# Patient Record
Sex: FEMALE | Race: WHITE | NOT HISPANIC OR LATINO | Employment: PART TIME | URBAN - METROPOLITAN AREA
[De-identification: names, ages, dates, MRNs, and addresses within clinical notes are randomized per-mention and may not be internally consistent; named-entity substitution may affect disease eponyms.]

---

## 2019-03-14 ENCOUNTER — HOSPITAL ENCOUNTER (EMERGENCY)
Facility: CLINIC | Age: 54
Discharge: HOME | End: 2019-03-15
Attending: PHYSICIAN ASSISTANT | Admitting: PHYSICIAN ASSISTANT
Payer: COMMERCIAL

## 2019-03-14 DIAGNOSIS — A08.4 VIRAL GASTROENTERITIS: ICD-10-CM

## 2019-03-14 DIAGNOSIS — R11.10 VOMITING AND DIARRHEA: ICD-10-CM

## 2019-03-14 DIAGNOSIS — R19.7 VOMITING AND DIARRHEA: ICD-10-CM

## 2019-03-14 LAB
ALBUMIN SERPL-MCNC: 4.4 G/DL (ref 3.4–5)
ALP SERPL-CCNC: 73 U/L (ref 40–150)
ALT SERPL W P-5'-P-CCNC: 37 U/L (ref 0–50)
ANION GAP SERPL CALCULATED.3IONS-SCNC: 10 MMOL/L (ref 3–14)
AST SERPL W P-5'-P-CCNC: 33 U/L (ref 0–45)
BASOPHILS # BLD AUTO: 0 10E9/L (ref 0–0.2)
BASOPHILS NFR BLD AUTO: 0.3 %
BILIRUB SERPL-MCNC: 0.9 MG/DL (ref 0.2–1.3)
BUN SERPL-MCNC: 22 MG/DL (ref 7–30)
CALCIUM SERPL-MCNC: 8.9 MG/DL (ref 8.5–10.1)
CHLORIDE SERPL-SCNC: 105 MMOL/L (ref 94–109)
CO2 SERPL-SCNC: 24 MMOL/L (ref 20–32)
CREAT SERPL-MCNC: 0.75 MG/DL (ref 0.52–1.04)
DIFFERENTIAL METHOD BLD: ABNORMAL
EOSINOPHIL # BLD AUTO: 0 10E9/L (ref 0–0.7)
EOSINOPHIL NFR BLD AUTO: 0.1 %
ERYTHROCYTE [DISTWIDTH] IN BLOOD BY AUTOMATED COUNT: 11.5 % (ref 10–15)
GFR SERPL CREATININE-BSD FRML MDRD: >90 ML/MIN/{1.73_M2}
GLUCOSE SERPL-MCNC: 133 MG/DL (ref 70–99)
HCT VFR BLD AUTO: 50.2 % (ref 35–47)
HGB BLD-MCNC: 17.5 G/DL (ref 11.7–15.7)
IMM GRANULOCYTES # BLD: 0.1 10E9/L (ref 0–0.4)
IMM GRANULOCYTES NFR BLD: 0.5 %
LIPASE SERPL-CCNC: 194 U/L (ref 73–393)
LYMPHOCYTES # BLD AUTO: 0.8 10E9/L (ref 0.8–5.3)
LYMPHOCYTES NFR BLD AUTO: 4.9 %
MCH RBC QN AUTO: 33.9 PG (ref 26.5–33)
MCHC RBC AUTO-ENTMCNC: 34.9 G/DL (ref 31.5–36.5)
MCV RBC AUTO: 97 FL (ref 78–100)
MONOCYTES # BLD AUTO: 0.6 10E9/L (ref 0–1.3)
MONOCYTES NFR BLD AUTO: 4 %
NEUTROPHILS # BLD AUTO: 14.4 10E9/L (ref 1.6–8.3)
NEUTROPHILS NFR BLD AUTO: 90.2 %
NRBC # BLD AUTO: 0 10*3/UL
NRBC BLD AUTO-RTO: 0 /100
PLATELET # BLD AUTO: 303 10E9/L (ref 150–450)
POTASSIUM SERPL-SCNC: 3.6 MMOL/L (ref 3.4–5.3)
PROT SERPL-MCNC: 8.5 G/DL (ref 6.8–8.8)
RBC # BLD AUTO: 5.16 10E12/L (ref 3.8–5.2)
SODIUM SERPL-SCNC: 139 MMOL/L (ref 133–144)
WBC # BLD AUTO: 15.9 10E9/L (ref 4–11)

## 2019-03-14 PROCEDURE — 96374 THER/PROPH/DIAG INJ IV PUSH: CPT

## 2019-03-14 PROCEDURE — 83690 ASSAY OF LIPASE: CPT | Performed by: PHYSICIAN ASSISTANT

## 2019-03-14 PROCEDURE — 25000128 H RX IP 250 OP 636: Performed by: PHYSICIAN ASSISTANT

## 2019-03-14 PROCEDURE — 85025 COMPLETE CBC W/AUTO DIFF WBC: CPT | Performed by: PHYSICIAN ASSISTANT

## 2019-03-14 PROCEDURE — 96375 TX/PRO/DX INJ NEW DRUG ADDON: CPT

## 2019-03-14 PROCEDURE — 80053 COMPREHEN METABOLIC PANEL: CPT | Performed by: PHYSICIAN ASSISTANT

## 2019-03-14 PROCEDURE — 96361 HYDRATE IV INFUSION ADD-ON: CPT

## 2019-03-14 PROCEDURE — 99284 EMERGENCY DEPT VISIT MOD MDM: CPT | Mod: 25

## 2019-03-14 RX ORDER — METOCLOPRAMIDE HYDROCHLORIDE 5 MG/ML
10 INJECTION INTRAMUSCULAR; INTRAVENOUS ONCE
Status: COMPLETED | OUTPATIENT
Start: 2019-03-14 | End: 2019-03-14

## 2019-03-14 RX ORDER — ONDANSETRON 2 MG/ML
4 INJECTION INTRAMUSCULAR; INTRAVENOUS ONCE
Status: COMPLETED | OUTPATIENT
Start: 2019-03-14 | End: 2019-03-14

## 2019-03-14 RX ADMIN — SODIUM CHLORIDE 1000 ML: 9 INJECTION, SOLUTION INTRAVENOUS at 23:18

## 2019-03-14 RX ADMIN — SODIUM CHLORIDE 1000 ML: 9 INJECTION, SOLUTION INTRAVENOUS at 22:28

## 2019-03-14 RX ADMIN — METOCLOPRAMIDE 10 MG: 5 INJECTION, SOLUTION INTRAMUSCULAR; INTRAVENOUS at 23:18

## 2019-03-14 RX ADMIN — ONDANSETRON 4 MG: 2 INJECTION INTRAMUSCULAR; INTRAVENOUS at 22:34

## 2019-03-14 ASSESSMENT — ENCOUNTER SYMPTOMS
ABDOMINAL PAIN: 1
DIARRHEA: 1
DYSURIA: 0
LIGHT-HEADEDNESS: 1
FEVER: 0
CHILLS: 1
NAUSEA: 1
BACK PAIN: 1
VOMITING: 1
FREQUENCY: 0
DIFFICULTY URINATING: 0

## 2019-03-14 ASSESSMENT — MIFFLIN-ST. JEOR: SCORE: 1135.25

## 2019-03-14 NOTE — ED AVS SNAPSHOT
[facility] Emergency Department  [address]  [facility] [address]  Phone:  [phone]  Fax:  [phone]                                    Lillian Boyd   MRN: [id number]    Department:  [facility] Emergency Department   Date of Visit:  3/14/2019           After Visit Summary Signature Page    I have received my discharge instructions, and my questions have been answered. I have discussed any challenges I see with this plan with the nurse or doctor.    ..........................................................................................................................................  Patient/Patient Representative Signature      ..........................................................................................................................................  Patient Representative Print Name and Relationship to Patient    ..................................................               ................................................  Date                                   Time    ..........................................................................................................................................  Reviewed by Signature/Title    ...................................................              ..............................................  Date                                               Time          22EPIC Rev 08/18

## 2019-03-15 VITALS
TEMPERATURE: 97.6 F | HEART RATE: 93 BPM | RESPIRATION RATE: 16 BRPM | BODY MASS INDEX: 23 KG/M2 | WEIGHT: 125 LBS | HEIGHT: 62 IN | SYSTOLIC BLOOD PRESSURE: 118 MMHG | DIASTOLIC BLOOD PRESSURE: 67 MMHG | OXYGEN SATURATION: 99 %

## 2019-03-15 PROCEDURE — 96375 TX/PRO/DX INJ NEW DRUG ADDON: CPT

## 2019-03-15 PROCEDURE — 25000128 H RX IP 250 OP 636: Performed by: PHYSICIAN ASSISTANT

## 2019-03-15 RX ORDER — ONDANSETRON 4 MG/1
4 TABLET, ORALLY DISINTEGRATING ORAL EVERY 8 HOURS PRN
Qty: 10 TABLET | Refills: 0 | Status: SHIPPED | OUTPATIENT
Start: 2019-03-15 | End: 2019-03-18

## 2019-03-15 RX ADMIN — PROCHLORPERAZINE EDISYLATE 10 MG: 5 INJECTION INTRAMUSCULAR; INTRAVENOUS at 00:02

## 2019-03-15 NOTE — ED PROVIDER NOTES
"  History     Chief Complaint:  Nausea, Vomiting, & Diarrhea    HPI   Lillian Boyd is a 53 year old female who presents for evaluation of nausea, vomiting, and diarrhea. She reports that these symptoms started around 1830 and have been intractable. The last few times that she was on the toilet, she reports that she briefly lost consciousness. Because of the sheer \"aggressivity\" of her symptoms, she decided to present to the ED. Here, she is still lightheaded and reports additional chills, diffuse abdominal pain, back pain, and bilateral leg cramping. She denies any fever or urinary symptoms, nor has she noted blood in her emesis or stool. Yesterday, she does report spending time with her sister who was exhibiting similar symptoms.     Allergies:  NKDA    Medications:    Paxil    Past Medical History:    Depression    Past Surgical History:    Shoulder repair  D&C  Knee surgery  Herniorrhaphy     Family History:    HTN  Arthritis  Osteoporosis     Social History:  Marital Status:   [2]   at bedside.   Negative for tobacco use.  Positive for alcohol use. Comment: Occasional.      Review of Systems   Constitutional: Positive for chills. Negative for fever.   Cardiovascular: Negative for chest pain.   Gastrointestinal: Positive for abdominal pain, diarrhea, nausea and vomiting.   Genitourinary: Negative for difficulty urinating, dysuria and frequency.   Musculoskeletal: Positive for back pain.   Neurological: Positive for light-headedness.   All other systems reviewed and are negative.    Physical Exam     Patient Vitals for the past 24 hrs:   BP Temp Temp src Pulse Resp SpO2 Height Weight   03/15/19 0000 118/67 -- -- 93 -- 99 % -- --   03/14/19 2315 110/65 -- -- 81 16 99 % -- --   03/14/19 2300 106/66 -- -- 77 16 96 % -- --   03/14/19 2230 114/78 -- -- 77 18 95 % -- --   03/14/19 2202 116/77 97.6  F (36.4  C) Oral 102 20 100 % 1.575 m (5' 2\") 56.7 kg (125 lb)      Physical Exam  Constitutional: " uncomfortable appearing, but non-toxic.   Head: No external signs of trauma noted to head or face.   Eyes: Pupils are equal, round, and reactive to light. Conjunctiva normal   ENT: MMM. Normal voice.   Neck: Normal ROM.    Cardiovascular: Normal rate, regular rhythm, and intact distal pulses.    Respiratory: Effort normal. No respiratory distress. Lungs clear to auscultation bilaterally.   GI: Soft. There is no tenderness.    Musculoskeletal: No deformities appreciated. Normal ROM. No edema noted.  Neurological: Alert and Oriented x 3. Speech normal. Moves all extremities equally.    Psychiatric: Appropriate mood, affect, and behavior.   Skin: Skin is warm and dry.      Emergency Department Course   Laboratory:  CBC: WBC: 15.9 (H), HGB: 17.5 (H), PLT: 303  CMP: Glucose 133 (H), o/w WNL (Creatinine: 0.75)    Lipase: 194    Interventions:  2228 NS 1L IV   2234 Zofran, 4 mg, IV injection   2318 Reglan, 10 mg, IV injection   NS 1L IV   0002 Compazine, 10 mg, IV injection    Emergency Department Course:  Nursing notes and vitals reviewed. (2213) I performed an exam of the patient as documented above.      IV inserted. Medicine administered as documented above. Blood drawn. This was sent to the lab for further testing, results above.     The patient provided a urine sample here in the emergency department. This was sent for laboratory testing, findings above.       (2311) I rechecked the patient and discussed the results of her workup thus far. She is still nauseous, so medications were ordered as above.      (2354) Patient is still complaining of nausea. More medications ordered.     (0015) I rechecked the patient and she is amenable to discharge.     Findings and plan explained to the Patient. Patient discharged home with instructions regarding supportive care, medications, and reasons to return. The importance of close follow-up was reviewed. The patient was prescribed Zofran ODT tablets.     I personally reviewed the  laboratory results with the Patient and answered all related questions prior to discharge.     Impression & Plan      Medical Decision Making:  Lillian Boyd is a 53 year old female who presents with acute nausea, vomiting and diarrhea. She has a benign abdominal exam without focal RLQ or LLQ tenderness to suggest diverticulitis or appendicitis, respectively, or other acute abdominal process. Therefore, I do not think she requires CT imaging at this time. I did discuss the sometimes vague initial constellation of symptoms early in the course of acute abdominal processes, and the need for immediate return should pain or other concerning symptoms develop. Symptoms are improved after IV fluids and symptomatic treatment.  There has been no travel or antibiotic exposure to suggest more concerning cause of diarrhea, and there has been no hematemesis or BRBPR/melena.  Vital signs have remained normal and stable throughout the ED course, and the abdominal re-exam remains benign. I believe she is safe for discharge in improved condition at this time with strict return precautions for recurrent vomiting, pain, fever or any other concerning symptoms.      Critical Care time:  none    Diagnosis:    ICD-10-CM    1. Vomiting and diarrhea R11.10     R19.7    2. Viral gastroenteritis A08.4        Disposition:  discharged to home    Discharge Medications:     Medication List      Started    ondansetron 4 MG ODT tab  Commonly known as:  ZOFRAN ODT  4 mg, Oral, EVERY 8 HOURS PRN          Scribe Disclosure:  I, Desi Oden, am serving as a scribe on 3/14/2019 at 10:13 PM to personally document services performed by Kelsie Batres PA-C based on my observations and the provider's statements to me.     Desi Oden  3/14/2019   [facility] EMERGENCY DEPARTMENT       Kelsie Batres PA-C  03/15/19 0027

## 2024-11-22 ENCOUNTER — HOSPITAL ENCOUNTER (OUTPATIENT)
Dept: MAMMOGRAPHY | Facility: CLINIC | Age: 59
Discharge: HOME | End: 2024-11-22
Attending: PHYSICIAN ASSISTANT
Payer: COMMERCIAL

## 2024-11-22 ENCOUNTER — HOSPITAL ENCOUNTER (OUTPATIENT)
Dept: BONE DENSITY | Facility: CLINIC | Age: 59
Discharge: HOME | End: 2024-11-22
Attending: PHYSICIAN ASSISTANT
Payer: COMMERCIAL

## 2024-11-22 DIAGNOSIS — Z00.00 HEALTHCARE MAINTENANCE: ICD-10-CM

## 2024-11-22 DIAGNOSIS — Z12.31 VISIT FOR SCREENING MAMMOGRAM: ICD-10-CM

## 2024-11-22 PROCEDURE — 77080 DXA BONE DENSITY AXIAL: CPT

## 2024-11-22 PROCEDURE — 77063 BREAST TOMOSYNTHESIS BI: CPT

## 2024-11-22 PROCEDURE — 77067 SCR MAMMO BI INCL CAD: CPT

## 2024-12-22 ENCOUNTER — HEALTH MAINTENANCE LETTER (OUTPATIENT)
Age: 59
End: 2024-12-22